# Patient Record
Sex: MALE
[De-identification: names, ages, dates, MRNs, and addresses within clinical notes are randomized per-mention and may not be internally consistent; named-entity substitution may affect disease eponyms.]

---

## 2017-08-15 ENCOUNTER — APPOINTMENT (OUTPATIENT)
Dept: DERMATOLOGY | Facility: CLINIC | Age: 47
End: 2017-08-15
Payer: MEDICARE

## 2017-08-15 VITALS — SYSTOLIC BLOOD PRESSURE: 92 MMHG | DIASTOLIC BLOOD PRESSURE: 66 MMHG | WEIGHT: 170 LBS

## 2017-08-15 DIAGNOSIS — L73.2 HIDRADENITIS SUPPURATIVA: ICD-10-CM

## 2017-08-15 PROBLEM — Z00.00 ENCOUNTER FOR PREVENTIVE HEALTH EXAMINATION: Status: ACTIVE | Noted: 2017-08-15

## 2017-08-15 PROCEDURE — 99203 OFFICE O/P NEW LOW 30 MIN: CPT

## 2021-10-31 DIAGNOSIS — D50.0 IRON DEFICIENCY ANEMIA SECONDARY TO BLOOD LOSS (CHRONIC): ICD-10-CM

## 2021-10-31 DIAGNOSIS — H54.8 LEGAL BLINDNESS, AS DEFINED IN USA: ICD-10-CM

## 2021-10-31 DIAGNOSIS — F17.210 NICOTINE DEPENDENCE, CIGARETTES, UNCOMPLICATED: ICD-10-CM

## 2021-10-31 DIAGNOSIS — Z78.9 OTHER SPECIFIED HEALTH STATUS: ICD-10-CM

## 2021-10-31 DIAGNOSIS — Z43.2 ENCOUNTER FOR ATTENTION TO ILEOSTOMY: ICD-10-CM

## 2021-11-05 PROBLEM — Z78.9 CURRENT NON-DRINKER OF ALCOHOL: Status: ACTIVE | Noted: 2021-11-05

## 2021-11-05 PROBLEM — Z43.2 ILEOSTOMY CARE: Status: ACTIVE | Noted: 2021-11-05

## 2021-11-05 PROBLEM — H54.8 LEGALLY BLIND: Status: ACTIVE | Noted: 2021-11-05

## 2021-11-05 PROBLEM — D50.0 IRON DEFICIENCY ANEMIA DUE TO CHRONIC BLOOD LOSS: Status: ACTIVE | Noted: 2021-11-05

## 2021-11-05 PROBLEM — F17.210 CIGARETTE SMOKER: Status: ACTIVE | Noted: 2021-11-05

## 2021-11-05 RX ORDER — METOCLOPRAMIDE 10 MG/1
10 TABLET ORAL
Qty: 1 | Refills: 0 | Status: ACTIVE | COMMUNITY
Start: 2021-11-05 | End: 1900-01-01

## 2021-11-05 NOTE — HISTORY OF PRESENT ILLNESS
[de-identified] : Dr. Elaine takes care of this very pleasant 50-year-old gentleman\par \par Legally blind\par \par History of apparent total colectomy\par \par Ileostomy many years ago\par \par Reason is unclear\par \par Iron deficiency anemia\par \par Recent upper endoscopy without source\par \par No difficulty with the ileostomy, brown stool according to referring doctor's note\par \par No dysphagia\par \par No pacemaker defibrillator\par \par No history of bowel obstruction that they are aware of\par \par The father was part of the conversation\par \par

## 2021-11-05 NOTE — ASSESSMENT
[FreeTextEntry1] : Impression\par \par Iron deficiency anemia\par \par Negative upper endoscopy\par \par Ileostomy performed with total colectomy many years ago for unknown reasons\par \par Suggest\par \par Agree with small bowel capsule endoscopy\par \par Procedure, risks, benefits and alternatives were reviewed with father and son\par \par Reglan 10 mg 1 hour prior to procedure

## 2021-11-05 NOTE — REASON FOR VISIT
[Initial Evaluation] : an initial evaluation [Father] : father [Verbal consent obtained from patient] : the patient, [unfilled] [FreeTextEntry1] : Iron deficiency anemia, request for capsule endoscopy